# Patient Record
Sex: MALE | Race: WHITE | URBAN - METROPOLITAN AREA
[De-identification: names, ages, dates, MRNs, and addresses within clinical notes are randomized per-mention and may not be internally consistent; named-entity substitution may affect disease eponyms.]

---

## 2017-04-25 ENCOUNTER — EMERGENCY (EMERGENCY)
Facility: HOSPITAL | Age: 47
LOS: 1 days | Discharge: HOME | End: 2017-04-25
Attending: EMERGENCY MEDICINE | Admitting: EMERGENCY MEDICINE
Payer: COMMERCIAL

## 2017-04-25 VITALS
SYSTOLIC BLOOD PRESSURE: 134 MMHG | OXYGEN SATURATION: 97 % | RESPIRATION RATE: 18 BRPM | HEART RATE: 80 BPM | DIASTOLIC BLOOD PRESSURE: 86 MMHG

## 2017-04-25 DIAGNOSIS — R04.0 EPISTAXIS: ICD-10-CM

## 2017-04-25 DIAGNOSIS — Z98.890 OTHER SPECIFIED POSTPROCEDURAL STATES: Chronic | ICD-10-CM

## 2017-04-25 PROCEDURE — 30901 CONTROL OF NOSEBLEED: CPT | Mod: LT

## 2017-04-25 PROCEDURE — 99284 EMERGENCY DEPT VISIT MOD MDM: CPT | Mod: 25

## 2017-04-25 PROCEDURE — 99283 EMERGENCY DEPT VISIT LOW MDM: CPT | Mod: 25

## 2017-04-25 NOTE — ED PROVIDER NOTE - ENMT, MLM
Airway patent,  Mouth with normal mucosa. Throat has no vesicles, no oropharyngeal exudates and uvula is midline  Nose: R nare clear.  L nare: mild anterior bleeding.

## 2017-04-25 NOTE — ED PROVIDER NOTE - OBJECTIVE STATEMENT
45 y/o M c/o nose bleed.  Intermittent throughout the day today.  L nare.  Now bleeding for the past 1 hour.  Not resolved with firm pressure.  Denies any other complaints.

## 2017-04-25 NOTE — ED ADULT NURSE NOTE - OBJECTIVE STATEMENT
Patient came in to ED c/o nosebleed started @ 10PM tonight on the left nostril. Patient came in to ED c/o nosebleed started @ 10PM tonight on the left nostril. Seen and examined by Dr. Paulson and cautery with silver nitrate done. Will continue to monitor.

## 2017-04-26 VITALS
RESPIRATION RATE: 15 BRPM | TEMPERATURE: 98 F | HEART RATE: 73 BPM | SYSTOLIC BLOOD PRESSURE: 123 MMHG | OXYGEN SATURATION: 98 % | DIASTOLIC BLOOD PRESSURE: 85 MMHG

## 2017-04-26 NOTE — ED ADULT NURSE REASSESSMENT NOTE - NS ED NURSE REASSESS COMMENT FT1
Reassessed by Dr. Paulson and no further bleeding noted. Patient for discharge.
Patient went home on stable condition ambulatory with steady gait. Advised follow up with ENT.